# Patient Record
Sex: MALE | Race: WHITE | NOT HISPANIC OR LATINO | Employment: OTHER | URBAN - NONMETROPOLITAN AREA
[De-identification: names, ages, dates, MRNs, and addresses within clinical notes are randomized per-mention and may not be internally consistent; named-entity substitution may affect disease eponyms.]

---

## 2021-01-26 ENCOUNTER — APPOINTMENT (OUTPATIENT)
Dept: LAB | Facility: HOSPITAL | Age: 56
End: 2021-01-26
Attending: PHYSICIAN ASSISTANT
Payer: COMMERCIAL

## 2021-01-26 DIAGNOSIS — Z20.822 CLOSE EXPOSURE TO 2019 NOVEL CORONAVIRUS: ICD-10-CM

## 2021-01-26 LAB — SARS-COV-2 RNA RESP QL NAA+PROBE: NOT DETECTED

## 2021-01-26 PROCEDURE — U0002 COVID-19 LAB TEST NON-CDC: HCPCS

## 2021-05-24 PROBLEM — K80.10 CALCULUS OF GALLBLADDER WITH CHRONIC CHOLECYSTITIS WITHOUT OBSTRUCTION: Status: ACTIVE | Noted: 2021-05-24

## 2021-08-08 ENCOUNTER — APPOINTMENT (OUTPATIENT)
Dept: LAB | Facility: HOSPITAL | Age: 56
End: 2021-08-08
Attending: PHYSICIAN ASSISTANT
Payer: COMMERCIAL

## 2021-08-08 DIAGNOSIS — R05.9 COUGH: ICD-10-CM

## 2021-08-08 LAB — SARS-COV-2 RNA RESP QL NAA+PROBE: NOT DETECTED

## 2021-08-08 PROCEDURE — U0002 COVID-19 LAB TEST NON-CDC: HCPCS | Performed by: PHYSICIAN ASSISTANT

## 2021-11-05 PROBLEM — K21.00 GASTROESOPHAGEAL REFLUX DISEASE WITH ESOPHAGITIS WITHOUT HEMORRHAGE: Status: ACTIVE | Noted: 2021-11-05

## 2021-11-05 PROBLEM — Z12.11 ENCOUNTER FOR SCREENING COLONOSCOPY FOR NON-HIGH-RISK PATIENT: Status: ACTIVE | Noted: 2021-11-05

## 2023-10-11 DIAGNOSIS — N50.819 TESTICULAR PAIN: Primary | ICD-10-CM

## 2023-10-13 ENCOUNTER — CLINICAL SUPPORT (OUTPATIENT)
Dept: REHABILITATION | Facility: HOSPITAL | Age: 58
End: 2023-10-13
Payer: COMMERCIAL

## 2023-10-13 DIAGNOSIS — M62.838 MUSCLE SPASM: ICD-10-CM

## 2023-10-13 DIAGNOSIS — N50.819 PAIN IN TESTICLE, UNSPECIFIED LATERALITY: Primary | ICD-10-CM

## 2023-10-13 DIAGNOSIS — R27.8 COORDINATION ABNORMAL: ICD-10-CM

## 2023-10-13 PROCEDURE — 97161 PT EVAL LOW COMPLEX 20 MIN: CPT | Mod: PN | Performed by: PHYSICAL THERAPIST
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
## 2023-11-01 ENCOUNTER — CLINICAL SUPPORT (OUTPATIENT)
Dept: REHABILITATION | Facility: HOSPITAL | Age: 58
End: 2023-11-01
Attending: SPECIALIST
Payer: COMMERCIAL

## 2023-11-01 DIAGNOSIS — M62.838 MUSCLE SPASM: ICD-10-CM

## 2023-11-01 DIAGNOSIS — N50.819 PAIN IN TESTICLE, UNSPECIFIED LATERALITY: Primary | ICD-10-CM

## 2023-11-01 DIAGNOSIS — R27.8 COORDINATION ABNORMAL: ICD-10-CM

## 2023-11-01 PROCEDURE — 97530 THERAPEUTIC ACTIVITIES: CPT | Mod: PN | Performed by: PHYSICAL THERAPIST

## 2023-11-01 PROCEDURE — 97140 MANUAL THERAPY 1/> REGIONS: CPT | Mod: PN | Performed by: PHYSICAL THERAPIST

## 2023-11-01 NOTE — PROGRESS NOTES
Pelvic Health Physical Therapy   Treatment Note     Name: Darion Katz  Clinic Number: [id number]    Therapy Diagnosis:   Encounter Diagnoses   Name Primary?    Pain in testicle, unspecified laterality Yes    Muscle spasm     Coordination abnormal      Physician: Ariella Robb MD    Visit Date: 11/1/2023    Physician Orders: PT Eval and Treat  Medical Diagnosis from Referral: N50.819 (ICD-10-CM) - Testicular pain   Evaluation Date: 10/13/2023  Authorization Period Expiration: 10/10/24  Plan of Care Expiration: 1/5/2024  Visit # 2/12 Visits authorized: 1/20    Cancelled Visits: 0  No Show Visits: 0    Time In: 2:30 PM   Time Out: 3:12 PM   Total Billable Time: 42 minutes    Precautions: Standard    Subjective     Pt reports: He states he went to [place] over the weekend.  He was compliant with home exercise program.  Response to previous treatment: none  Functional change: none    Pain in:  2/10  Pain out: 1/10  Location: left hip        Objective       Darion received the following manual therapy techniques: to develop flexibility and extensibility for 25 minutes including: trigger point/myofascial release of the left psoas, passive left iliac depression, passive left hip extension to tolerance.     Darion participated in dynamic functional therapeutic activities to improve functional performance for 17  minutes, including:  Provided with home exercise program for lower extremity ROM and strength training.           Intervention Eval  11/01/2023    Neuro Re-Ed         Manual Ther Manual therapy      trigger point/myofascial release of the left psoas, passive left iliac depression, passive left hip extension to tolerance.     TherAct Education    Provided with home exercise program for lower extremity ROM and strength training.            Home Exercises Provided and Patient Education Provided     Education provided:   - posture/body mechanices  Discussed progression of plan of care with patient; educated pt in  activity modification; reviewed HEP with pt. Pt demonstrated and verbalized understanding of all instruction and was provided with a handout of HEP (see Patient Instructions).    Written Home Exercises Provided: yes.  Exercises were reviewed and Darion was able to demonstrate them prior to the end of the session.  Darion demonstrated good  understanding of the education provided.     See EMR under Patient Instructions for exercises provided 11/01/2023 .    Assessment     Patient with improved pain after physical therapist session. Good understanding of physical therapist recommendations and is willing to comply.     Darion Is progressing well towards his goals.   Pt prognosis is Excellent.     Pt will continue to benefit from skilled outpatient physical therapy to address the deficits listed in the problem list box on initial evaluation, provide pt/family education and to maximize pt's level of independence in the home and community environment.     Pt's spiritual, cultural and educational needs considered and pt agreeable to plan of care and goals.     Anticipated barriers to physical therapy: none    Goals:  Short Term Goals: 6 weeks   Pt indep in HEP  Patient will report left testicular pain of 2/10 at its worst to improve QoL.  Patient will be independent with lumbar spine and hip mobility.   Patient will deny urinary leakage     Long Term Goals: 12 weeks   Pt independent  in progressive HEP  Patient will deny testicular pain.  Patient will have bilateral hip extension to 10* to improve gait mechanics and pain.  Patient will be independent with pelvic floor down training and awareness.     Plan     Continue with progressive left hip mobility to assist with pain management.     Harris Asher, PT

## 2023-11-09 ENCOUNTER — CLINICAL SUPPORT (OUTPATIENT)
Dept: REHABILITATION | Facility: HOSPITAL | Age: 58
End: 2023-11-09
Payer: COMMERCIAL

## 2023-11-09 DIAGNOSIS — R27.8 COORDINATION ABNORMAL: ICD-10-CM

## 2023-11-09 DIAGNOSIS — N50.819 PAIN IN TESTICLE, UNSPECIFIED LATERALITY: Primary | ICD-10-CM

## 2023-11-09 DIAGNOSIS — N50.812 PAIN IN LEFT TESTICLE: ICD-10-CM

## 2023-11-09 DIAGNOSIS — M62.838 MUSCLE SPASM: ICD-10-CM

## 2023-11-09 PROCEDURE — 97112 NEUROMUSCULAR REEDUCATION: CPT | Mod: PN | Performed by: PHYSICAL THERAPIST

## 2023-11-09 PROCEDURE — 97530 THERAPEUTIC ACTIVITIES: CPT | Mod: PN | Performed by: PHYSICAL THERAPIST

## 2023-11-09 NOTE — PROGRESS NOTES
Pelvic Health Physical Therapy   Treatment Note     Name: Darion Katz  Clinic Number: [id number]    Therapy Diagnosis:   Encounter Diagnoses   Name Primary?    Pain in testicle, unspecified laterality Yes    Muscle spasm     Coordination abnormal     Pain in left testicle      Physician: No ref. provider found    Visit Date: 11/9/2023    Physician Orders: PT Eval and Treat  Medical Diagnosis from Referral: N50.819 (ICD-10-CM) - Testicular pain   Evaluation Date: 10/13/2023  Authorization Period Expiration: 10/10/24  Plan of Care Expiration: 1/5/2024  Progress note due: 12/7/2023  Visit # 3/12 Visits authorized: 1/20    Cancelled Visits: 0  No Show Visits: 0    Time In: 11:19 AM    Time Out: 11:58 AM   Total Billable Time: 39 minutes    Precautions: Standard    Subjective     Pt reports: He states his pain is doing well. Had pain 1 day for a little while, then it went away. More better days than pain days.       He was compliant with home exercise program.  Response to previous treatment: none  Functional change: none    Pain in:  0/10  Pain out: 0/10  Location: left hip        Objective     Darion participated in dynamic functional therapeutic activities to improve functional performance for 24  minutes, including:  Educated on improved bladder habits to assist with leakage of urine. Educated on PF relaxation with bladder evacuation and PF contraction to prevent leakage. Educated on diaphragmatic breathing. Educated on milking the penis to assist with post void dribble.     Darion participated in neuromuscular re-education activities to develop Coordination, Down training, and Proprioception for 15 minutes including: pelvic floor relaxation/bulging training and rehabilitative ultrasound imaging to help visualize pelvic floor muscle contraction and relaxation with kegels         Intervention Eval  11/01/2023 11/09/2023    Neuro Re-Ed Rehabilitative ultrasound     Rehabilitative ultrasound with pelvic floor down  training - pelvic floor coordination and awareness   Manual Ther Manual therapy      trigger point/myofascial release of the left psoas, passive left iliac depression, passive left hip extension to tolerance.     TherAct Education    Provided with home exercise program for lower extremity ROM and strength training.            Home Exercises Provided and Patient Education Provided     Education provided:   - posture/body mechanices  Discussed progression of plan of care with patient; educated pt in activity modification; reviewed HEP with pt. Pt demonstrated and verbalized understanding of all instruction and was provided with a handout of HEP (see Patient Instructions).    Written Home Exercises Provided: yes.  Exercises were reviewed and Darion was able to demonstrate them prior to the end of the session.  Darion demonstrated good  understanding of the education provided.     See EMR under Patient Instructions for exercises provided 11/01/2023 .    Assessment     Patient with improved pain after physical therapist session. Good understanding of physical therapist recommendations and is willing to comply.     Darion Is progressing well towards his goals.   Pt prognosis is Excellent.     Pt will continue to benefit from skilled outpatient physical therapy to address the deficits listed in the problem list box on initial evaluation, provide pt/family education and to maximize pt's level of independence in the home and community environment.     Pt's spiritual, cultural and educational needs considered and pt agreeable to plan of care and goals.     Anticipated barriers to physical therapy: none    Goals:  Short Term Goals: 6 weeks   Pt indep in home exercise program - Goal met 11/09/2023   Patient will report left testicular pain of 2/10 at its worst to improve QoL. Goal met 11/09/2023  Patient will be independent with lumbar spine and hip mobility.   Patient will deny urinary leakage. Mild leakage of urine - goal not met -  progressing      Long Term Goals: 12 weeks   Pt independent  in progressive home exercise program Goal met 11/09/2023  Patient will deny testicular pain. Goal met 11/09/2023  Patient will have bilateral hip extension to 10* to improve gait mechanics and pain.  Patient will be independent with pelvic floor down training and awareness. Goal not met - progressing    Plan     Continue with progressive left hip mobility to assist with pain management.     Harris Asher, PT

## 2023-11-09 NOTE — PATIENT INSTRUCTIONS
Pelvic floor relaxation: gentle feeling of down and out   - occurs when urination, bowel movement or passing gas  - every hour - tune into your pelvic floor and relax   - do not hold tension in the hips  - with breathing - let the diaphragm descend and the pelvic floor descend   - do not push or force the urine out - relax it out     Milk the penis - start at the base of the penis and give gentle pressure forward to clear the 'hose'    Your choice if you sit or stand to urinate -     DIAPHRAGMATIC BREATHING     The diaphragm is a dome shaped muscle that forms the floor of the rib cage. It is the most efficient muscle for breathing and relaxation, although most people are not used to using the diaphragm. Diaphragmatic or belly breathing is an important technique to learn because it helps settle down or relax the autonomic nervous system. The correct use of diaphragmatic breathing can help to quiet brain activity resulting in the relaxation of all the muscles and organs of the body. This is accomplished by slow rhythmic breathing concentrated in the diaphragm muscle rather than the chest.    How to do proper relaxation breathing:    Start by lying on your back or reclining in a chair in a relaxed position. Place one hand on your chest and the other on your abdomen.  Relax your jaw by placing your tongue on the floor of your mouth and keeping your teeth slightly apart.   Take a deep breath in, letting the abdomen expand and rise while you keep your upper chest, neck and shoulders relaxed.   As you breathe out, allow your abdomen and chest to fall. Exhale completely.  It doesn't matter if you breathe in/out through your nose and/or mouth. Do whichever feels comfortable.  Remember to breathe slowly.  Do not force your breathing. Do not hold your breath.  Repeat for 5 minutes every day.

## 2023-11-30 ENCOUNTER — CLINICAL SUPPORT (OUTPATIENT)
Dept: REHABILITATION | Facility: HOSPITAL | Age: 58
End: 2023-11-30
Attending: SPECIALIST
Payer: COMMERCIAL

## 2023-11-30 DIAGNOSIS — R27.8 COORDINATION ABNORMAL: ICD-10-CM

## 2023-11-30 DIAGNOSIS — M62.838 MUSCLE SPASM: ICD-10-CM

## 2023-11-30 DIAGNOSIS — N50.819 PAIN IN TESTICLE, UNSPECIFIED LATERALITY: Primary | ICD-10-CM

## 2023-11-30 DIAGNOSIS — N50.812 PAIN IN LEFT TESTICLE: ICD-10-CM

## 2023-11-30 PROCEDURE — 97112 NEUROMUSCULAR REEDUCATION: CPT | Mod: PN | Performed by: PHYSICAL THERAPIST

## 2023-11-30 PROCEDURE — 97530 THERAPEUTIC ACTIVITIES: CPT | Mod: PN | Performed by: PHYSICAL THERAPIST

## 2023-11-30 NOTE — PROGRESS NOTES
Pelvic Health Physical Therapy   Treatment Note     Name: Darion Katz  Clinic Number: [id number]    Therapy Diagnosis:   Encounter Diagnoses   Name Primary?    Pain in testicle, unspecified laterality Yes    Muscle spasm     Pain in left testicle     Coordination abnormal      Physician: Ariella Robb MD    Visit Date: 11/30/2023    Physician Orders: PT Eval and Treat  Medical Diagnosis from Referral: N50.819 (ICD-10-CM) - Testicular pain   Evaluation Date: 10/13/2023  Authorization Period Expiration: 10/10/24  Plan of Care Expiration: 1/5/2024  Progress note due: 12/7/2023  Visit # 4/12 Visits authorized: 3/20    Cancelled Visits: 0  No Show Visits: 0    Time In: 9:43 AM   Time Out: 10:19 AM   Total Billable Time: 36 minutes    Precautions: Standard    Subjective     Pt reports: patient states that his pain is gone! He had a little pain on Monday. It was not bad. Urine stream is stronger. Leakage of urine is not as bad as it was. He is doing his stretches every other day. He is also able to tolerate driving better. Used to go to the gym 4-5 days a week before his back surgery.     He was compliant with home exercise program.  Response to previous treatment: none  Functional change: none    Pain in:  0/10  Pain out: 0/10  Location: left hip        Objective     Darion participated in dynamic functional therapeutic activities to improve functional performance for 21  minutes, including:  Educated slow return to the gym as he tolerates with low impact activities.     Darion participated in neuromuscular re-education activities to develop Coordination, Down training, and Proprioception for 15 minutes including:Rehabilitative ultrasound - lower abdomen with pelvic floor down training       Intervention Eval  11/01/2023 11/09/2023 11/30/2023    Neuro Re-Ed Rehabilitative ultrasound     Rehabilitative ultrasound with pelvic floor down training - pelvic floor coordination and awareness Rehabilitative ultrasound -  lower abdomen with pelvic floor down training   Manual Ther Manual therapy      trigger point/myofascial release of the left psoas, passive left iliac depression, passive left hip extension to tolerance.      TherAct Education    Provided with home exercise program for lower extremity ROM and strength training.             Home Exercises Provided and Patient Education Provided     Education provided:   - posture/body mechanices  Discussed progression of plan of care with patient; educated pt in activity modification; reviewed HEP with pt. Pt demonstrated and verbalized understanding of all instruction and was provided with a handout of HEP (see Patient Instructions).    Written Home Exercises Provided: yes.  Exercises were reviewed and Darion was able to demonstrate them prior to the end of the session.  Darion demonstrated good  understanding of the education provided.     See EMR under Patient Instructions for exercises provided 11/01/2023 .    Assessment     Patient with improved pain after physical therapist session. Good understanding of physical therapist recommendations and is willing to comply.     Darion Is progressing well towards his goals.   Pt prognosis is Excellent.     Pt will continue to benefit from skilled outpatient physical therapy to address the deficits listed in the problem list box on initial evaluation, provide pt/family education and to maximize pt's level of independence in the home and community environment.     Pt's spiritual, cultural and educational needs considered and pt agreeable to plan of care and goals.     Anticipated barriers to physical therapy: none    Goals:  Short Term Goals: 6 weeks   Pt indep in home exercise program - Goal met 11/09/2023   Patient will report left testicular pain of 2/10 at its worst to improve QoL. Goal met 11/09/2023  Patient will be independent with lumbar spine and hip mobility.   Patient will deny urinary leakage. Mild leakage of urine - goal not met -  progressing      Long Term Goals: 12 weeks   Pt independent  in progressive home exercise program Goal met 11/09/2023  Patient will deny testicular pain. Goal met 11/09/2023  Patient will have bilateral hip extension to 10* to improve gait mechanics and pain.  Patient will be independent with pelvic floor down training and awareness. Goal not met - progressing    Plan     Continue with progressive left hip mobility to assist with pain management. Decrease frequency to every other week. Monitor tolerance for the return to the gym.     Harris Asher, PT

## 2023-12-14 ENCOUNTER — CLINICAL SUPPORT (OUTPATIENT)
Dept: REHABILITATION | Facility: HOSPITAL | Age: 58
End: 2023-12-14
Attending: SPECIALIST
Payer: COMMERCIAL

## 2023-12-14 DIAGNOSIS — N50.812 PAIN IN LEFT TESTICLE: ICD-10-CM

## 2023-12-14 DIAGNOSIS — R27.8 COORDINATION ABNORMAL: ICD-10-CM

## 2023-12-14 DIAGNOSIS — M62.838 MUSCLE SPASM: ICD-10-CM

## 2023-12-14 DIAGNOSIS — N50.819 PAIN IN TESTICLE, UNSPECIFIED LATERALITY: Primary | ICD-10-CM

## 2023-12-14 PROCEDURE — 97140 MANUAL THERAPY 1/> REGIONS: CPT | Mod: PN | Performed by: PHYSICAL THERAPIST

## 2023-12-14 PROCEDURE — 97530 THERAPEUTIC ACTIVITIES: CPT | Mod: PN | Performed by: PHYSICAL THERAPIST

## 2023-12-14 NOTE — PROGRESS NOTES
"  Pelvic Health Physical Therapy   Treatment Note and Discharge Summary     Name: Darion Katz  Clinic Number: [id number]    Therapy Diagnosis:   Encounter Diagnoses   Name Primary?    Pain in testicle, unspecified laterality Yes    Muscle spasm     Pain in left testicle     Coordination abnormal      Physician: Ariella Robb MD    Visit Date: 12/14/2023    Physician Orders: PT Eval and Treat  Medical Diagnosis from Referral: N50.819 (ICD-10-CM) - Testicular pain   Evaluation Date: 10/13/2023  Authorization Period Expiration: 10/10/24  Plan of Care Expiration: 1/5/2024  Progress note due: 1/11/2024  Visit # 5/12 Visits authorized: 4/20    Cancelled Visits: 0  No Show Visits: 0    Time In: 11:13 AM   Time Out: 11:51 AM   Total Billable Time: 38 minutes    Precautions: Standard    Subjective     Pt reports: Feeling good so far. Had one day with a little bother for a few hours, then it went away. Improved post void dribble. Has not returned to the gym.     He was compliant with home exercise program.  Response to previous treatment: none  Functional change: none    Pain in:  0/10  Pain out: 0/10 "That stretching you did felt good!"  Location: left hip        Objective     Hip extension: 10/13/23  Left hip: -5*  Right hip: 4*    Hip extension: 12/14/2023   Left hip: 5 *  Right hip: 11 *    Darion participated in dynamic functional therapeutic activities to improve functional performance for 23 minutes, including:  Educated slow return to the gym as he tolerates with low impact activities. Encouraged hip mobility and pain management.     Darion received the following manual therapy techniques: to develop flexibility and extensibility for 15 minutes including: trigger point/myofascial release of the left psoas, passive left iliac depression, passive left hip extension to tolerance.        Intervention Eval  11/01/2023 11/09/2023 11/30/2023 12/14/2023    Neuro Re-Ed Rehabilitative ultrasound     Rehabilitative " ultrasound with pelvic floor down training - pelvic floor coordination and awareness Rehabilitative ultrasound - lower abdomen with pelvic floor down training    Manual Ther Manual therapy      trigger point/myofascial release of the left psoas, passive left iliac depression, passive left hip extension to tolerance.    trigger point/myofascial release of the left psoas, passive left iliac depression, passive left hip extension to tolerance.    [employer] Education    Provided with home exercise program for lower extremity ROM and strength training.       Educated slow return to the gym as he tolerates with low impact activities. Encouraged hip mobility and pain management.          Home Exercises Provided and Patient Education Provided     Education provided:   - posture/body mechanices  Discussed progression of plan of care with patient; educated pt in activity modification; reviewed HEP with pt. Pt demonstrated and verbalized understanding of all instruction and was provided with a handout of HEP (see Patient Instructions).    Written Home Exercises Provided: yes.  Exercises were reviewed and Darion was able to demonstrate them prior to the end of the session.  Darion demonstrated good  understanding of the education provided.     See EMR under Patient Instructions for exercises provided 11/01/2023 .    Assessment     Patient with improved pain after physical therapist session. Good understanding of physical therapist recommendations and is willing to comply.     Darion Is progressing well towards his goals.   Pt prognosis is Excellent.     Pt will continue to benefit from skilled outpatient physical therapy to address the deficits listed in the problem list box on initial evaluation, provide pt/family education and to maximize pt's level of independence in the home and community environment.     Pt's spiritual, cultural and educational needs considered and pt agreeable to plan of care and goals.     Anticipated barriers  to physical therapy: none    Goals:  Short Term Goals: 6 weeks   Pt indep in home exercise program - Goal met 11/09/2023   Patient will report left testicular pain of 2/10 at its worst to improve QoL. Goal met 11/09/2023  Patient will be independent with lumbar spine and hip mobility. Goal met 12/14/2023   Patient will deny urinary leakage. Goal met 12/14/2023     Long Term Goals: 12 weeks   Pt independent  in progressive home exercise program Goal met 11/09/2023  Patient will deny testicular pain. Goal met 11/09/2023  Patient will have bilateral hip extension to 10* to improve gait mechanics and pain. Goal not met - ROM has improved, and pain has improved  Patient will be independent with pelvic floor down training and awareness. Goal met 12/14/2023    Plan   Patient is agreeable to discharge from physical therapy at this time. He is pleased with his progress and will return if needed. He has been compliant with all physical therapist recommendations.       Harris Asher, PT